# Patient Record
Sex: MALE | Race: WHITE | Employment: OTHER | URBAN - NONMETROPOLITAN AREA
[De-identification: names, ages, dates, MRNs, and addresses within clinical notes are randomized per-mention and may not be internally consistent; named-entity substitution may affect disease eponyms.]

---

## 2021-01-01 ENCOUNTER — APPOINTMENT (OUTPATIENT)
Dept: GENERAL RADIOLOGY | Age: 66
DRG: 283 | End: 2021-01-01
Attending: FAMILY MEDICINE
Payer: OTHER GOVERNMENT

## 2021-01-01 ENCOUNTER — HOSPITAL ENCOUNTER (INPATIENT)
Age: 66
LOS: 2 days | DRG: 283 | End: 2021-06-25
Attending: FAMILY MEDICINE | Admitting: FAMILY MEDICINE
Payer: OTHER GOVERNMENT

## 2021-01-01 VITALS — WEIGHT: 144 LBS | TEMPERATURE: 96.7 F | OXYGEN SATURATION: 90 % | HEIGHT: 71 IN | BODY MASS INDEX: 20.16 KG/M2

## 2021-01-01 LAB
ANION GAP SERPL CALCULATED.3IONS-SCNC: 12 MMOL/L (ref 7–19)
ANION GAP SERPL CALCULATED.3IONS-SCNC: 13 MMOL/L (ref 7–19)
ANION GAP SERPL CALCULATED.3IONS-SCNC: 14 MMOL/L (ref 7–19)
BASOPHILS ABSOLUTE: 0 K/UL (ref 0–0.2)
BASOPHILS ABSOLUTE: 0.1 K/UL (ref 0–0.2)
BASOPHILS RELATIVE PERCENT: 0.3 % (ref 0–1)
BASOPHILS RELATIVE PERCENT: 0.6 % (ref 0–1)
BUN BLDV-MCNC: 24 MG/DL (ref 8–23)
BUN BLDV-MCNC: 27 MG/DL (ref 8–23)
BUN BLDV-MCNC: 34 MG/DL (ref 8–23)
CALCIUM SERPL-MCNC: 7.9 MG/DL (ref 8.8–10.2)
CALCIUM SERPL-MCNC: 8 MG/DL (ref 8.8–10.2)
CALCIUM SERPL-MCNC: 8.2 MG/DL (ref 8.8–10.2)
CHLORIDE BLD-SCNC: 102 MMOL/L (ref 98–111)
CHLORIDE BLD-SCNC: 102 MMOL/L (ref 98–111)
CHLORIDE BLD-SCNC: 103 MMOL/L (ref 98–111)
CHOLESTEROL, TOTAL: 172 MG/DL (ref 160–199)
CO2: 20 MMOL/L (ref 22–29)
CO2: 22 MMOL/L (ref 22–29)
CO2: 24 MMOL/L (ref 22–29)
CREAT SERPL-MCNC: 0.8 MG/DL (ref 0.5–1.2)
CREAT SERPL-MCNC: 0.9 MG/DL (ref 0.5–1.2)
CREAT SERPL-MCNC: 1 MG/DL (ref 0.5–1.2)
EOSINOPHILS ABSOLUTE: 0 K/UL (ref 0–0.6)
EOSINOPHILS ABSOLUTE: 0 K/UL (ref 0–0.6)
EOSINOPHILS RELATIVE PERCENT: 0.1 % (ref 0–5)
EOSINOPHILS RELATIVE PERCENT: 0.5 % (ref 0–5)
GFR AFRICAN AMERICAN: >59
GFR NON-AFRICAN AMERICAN: >60
GLUCOSE BLD-MCNC: 110 MG/DL (ref 74–109)
GLUCOSE BLD-MCNC: 118 MG/DL (ref 74–109)
GLUCOSE BLD-MCNC: 154 MG/DL (ref 74–109)
HCT VFR BLD CALC: 35.3 % (ref 42–52)
HCT VFR BLD CALC: 37.1 % (ref 42–52)
HDLC SERPL-MCNC: 39 MG/DL (ref 55–121)
HEMOGLOBIN: 11.7 G/DL (ref 14–18)
HEMOGLOBIN: 11.9 G/DL (ref 14–18)
IMMATURE GRANULOCYTES #: 0 K/UL
IMMATURE GRANULOCYTES #: 0 K/UL
LDL CHOLESTEROL CALCULATED: 116 MG/DL
LV EF: 45 %
LVEF MODALITY: NORMAL
LYMPHOCYTES ABSOLUTE: 0.7 K/UL (ref 1.1–4.5)
LYMPHOCYTES ABSOLUTE: 1.5 K/UL (ref 1.1–4.5)
LYMPHOCYTES RELATIVE PERCENT: 17.8 % (ref 20–40)
LYMPHOCYTES RELATIVE PERCENT: 8.3 % (ref 20–40)
MCH RBC QN AUTO: 29.1 PG (ref 27–31)
MCH RBC QN AUTO: 29.5 PG (ref 27–31)
MCHC RBC AUTO-ENTMCNC: 32.1 G/DL (ref 33–37)
MCHC RBC AUTO-ENTMCNC: 33.1 G/DL (ref 33–37)
MCV RBC AUTO: 89.1 FL (ref 80–94)
MCV RBC AUTO: 90.7 FL (ref 80–94)
MONOCYTES ABSOLUTE: 0.9 K/UL (ref 0–0.9)
MONOCYTES ABSOLUTE: 1.1 K/UL (ref 0–0.9)
MONOCYTES RELATIVE PERCENT: 11 % (ref 0–10)
MONOCYTES RELATIVE PERCENT: 13.3 % (ref 0–10)
NEUTROPHILS ABSOLUTE: 5.8 K/UL (ref 1.5–7.5)
NEUTROPHILS ABSOLUTE: 6.3 K/UL (ref 1.5–7.5)
NEUTROPHILS RELATIVE PERCENT: 67.3 % (ref 50–65)
NEUTROPHILS RELATIVE PERCENT: 79.9 % (ref 50–65)
PDW BLD-RTO: 14.6 % (ref 11.5–14.5)
PDW BLD-RTO: 14.9 % (ref 11.5–14.5)
PLATELET # BLD: 231 K/UL (ref 130–400)
PLATELET # BLD: 269 K/UL (ref 130–400)
PMV BLD AUTO: 10.5 FL (ref 9.4–12.4)
PMV BLD AUTO: 10.6 FL (ref 9.4–12.4)
POTASSIUM REFLEX MAGNESIUM: 4.4 MMOL/L (ref 3.5–5)
POTASSIUM REFLEX MAGNESIUM: 4.8 MMOL/L (ref 3.5–5)
POTASSIUM REFLEX MAGNESIUM: 5.1 MMOL/L (ref 3.5–5)
RBC # BLD: 3.96 M/UL (ref 4.7–6.1)
RBC # BLD: 4.09 M/UL (ref 4.7–6.1)
SODIUM BLD-SCNC: 137 MMOL/L (ref 136–145)
SODIUM BLD-SCNC: 137 MMOL/L (ref 136–145)
SODIUM BLD-SCNC: 138 MMOL/L (ref 136–145)
TRIGL SERPL-MCNC: 83 MG/DL (ref 0–149)
TROPONIN: 0.07 NG/ML (ref 0–0.03)
TROPONIN: 0.08 NG/ML (ref 0–0.03)
TROPONIN: 0.09 NG/ML (ref 0–0.03)
WBC # BLD: 7.8 K/UL (ref 4.8–10.8)
WBC # BLD: 8.6 K/UL (ref 4.8–10.8)

## 2021-01-01 PROCEDURE — 80061 LIPID PANEL: CPT

## 2021-01-01 PROCEDURE — 6370000000 HC RX 637 (ALT 250 FOR IP): Performed by: FAMILY MEDICINE

## 2021-01-01 PROCEDURE — 99222 1ST HOSP IP/OBS MODERATE 55: CPT | Performed by: INTERNAL MEDICINE

## 2021-01-01 PROCEDURE — 94640 AIRWAY INHALATION TREATMENT: CPT

## 2021-01-01 PROCEDURE — 6360000002 HC RX W HCPCS: Performed by: FAMILY MEDICINE

## 2021-01-01 PROCEDURE — 85025 COMPLETE CBC W/AUTO DIFF WBC: CPT

## 2021-01-01 PROCEDURE — 2700000000 HC OXYGEN THERAPY PER DAY

## 2021-01-01 PROCEDURE — 36415 COLL VENOUS BLD VENIPUNCTURE: CPT

## 2021-01-01 PROCEDURE — 6370000000 HC RX 637 (ALT 250 FOR IP): Performed by: HOSPITALIST

## 2021-01-01 PROCEDURE — 51798 US URINE CAPACITY MEASURE: CPT

## 2021-01-01 PROCEDURE — 93005 ELECTROCARDIOGRAM TRACING: CPT

## 2021-01-01 PROCEDURE — 2140000000 HC CCU INTERMEDIATE R&B

## 2021-01-01 PROCEDURE — 93306 TTE W/DOPPLER COMPLETE: CPT

## 2021-01-01 PROCEDURE — 6360000002 HC RX W HCPCS: Performed by: HOSPITALIST

## 2021-01-01 PROCEDURE — 2580000003 HC RX 258: Performed by: FAMILY MEDICINE

## 2021-01-01 PROCEDURE — 94761 N-INVAS EAR/PLS OXIMETRY MLT: CPT

## 2021-01-01 PROCEDURE — 80048 BASIC METABOLIC PNL TOTAL CA: CPT

## 2021-01-01 PROCEDURE — 84484 ASSAY OF TROPONIN QUANT: CPT

## 2021-01-01 RX ORDER — SODIUM CHLORIDE 0.9 % (FLUSH) 0.9 %
5-40 SYRINGE (ML) INJECTION PRN
Status: DISCONTINUED | OUTPATIENT
Start: 2021-01-01 | End: 2021-01-01 | Stop reason: HOSPADM

## 2021-01-01 RX ORDER — LEVOTHYROXINE SODIUM 0.1 MG/1
100 TABLET ORAL
Status: DISCONTINUED | OUTPATIENT
Start: 2021-01-01 | End: 2021-01-01 | Stop reason: HOSPADM

## 2021-01-01 RX ORDER — PANTOPRAZOLE SODIUM 20 MG/1
20 TABLET, DELAYED RELEASE ORAL DAILY
Status: DISCONTINUED | OUTPATIENT
Start: 2021-01-01 | End: 2021-01-01 | Stop reason: HOSPADM

## 2021-01-01 RX ORDER — ACETAMINOPHEN 325 MG/1
650 TABLET ORAL EVERY 6 HOURS PRN
Status: DISCONTINUED | OUTPATIENT
Start: 2021-01-01 | End: 2021-01-01 | Stop reason: HOSPADM

## 2021-01-01 RX ORDER — BUDESONIDE AND FORMOTEROL FUMARATE DIHYDRATE 160; 4.5 UG/1; UG/1
2 AEROSOL RESPIRATORY (INHALATION) 2 TIMES DAILY
Status: DISCONTINUED | OUTPATIENT
Start: 2021-01-01 | End: 2021-01-01 | Stop reason: CLARIF

## 2021-01-01 RX ORDER — TAMSULOSIN HYDROCHLORIDE 0.4 MG/1
0.4 CAPSULE ORAL DAILY
Status: DISCONTINUED | OUTPATIENT
Start: 2021-01-01 | End: 2021-01-01 | Stop reason: HOSPADM

## 2021-01-01 RX ORDER — ALBUTEROL SULFATE 90 UG/1
2 AEROSOL, METERED RESPIRATORY (INHALATION) EVERY 6 HOURS PRN
COMMUNITY

## 2021-01-01 RX ORDER — MAGNESIUM SULFATE IN WATER 40 MG/ML
2000 INJECTION, SOLUTION INTRAVENOUS PRN
Status: DISCONTINUED | OUTPATIENT
Start: 2021-01-01 | End: 2021-01-01 | Stop reason: HOSPADM

## 2021-01-01 RX ORDER — SODIUM CHLORIDE 9 MG/ML
25 INJECTION, SOLUTION INTRAVENOUS PRN
Status: DISCONTINUED | OUTPATIENT
Start: 2021-01-01 | End: 2021-01-01 | Stop reason: HOSPADM

## 2021-01-01 RX ORDER — 0.9 % SODIUM CHLORIDE 0.9 %
500 INTRAVENOUS SOLUTION INTRAVENOUS ONCE
Status: COMPLETED | OUTPATIENT
Start: 2021-01-01 | End: 2021-01-01

## 2021-01-01 RX ORDER — POLYETHYLENE GLYCOL 3350 17 G/17G
17 POWDER, FOR SOLUTION ORAL DAILY PRN
Status: DISCONTINUED | OUTPATIENT
Start: 2021-01-01 | End: 2021-01-01

## 2021-01-01 RX ORDER — DOCUSATE SODIUM 100 MG/1
100 CAPSULE, LIQUID FILLED ORAL 2 TIMES DAILY
COMMUNITY

## 2021-01-01 RX ORDER — MEGESTROL ACETATE 40 MG/ML
400 SUSPENSION ORAL DAILY
COMMUNITY

## 2021-01-01 RX ORDER — ALBUTEROL SULFATE 2.5 MG/3ML
2.5 SOLUTION RESPIRATORY (INHALATION) EVERY 6 HOURS PRN
Status: DISCONTINUED | OUTPATIENT
Start: 2021-01-01 | End: 2021-01-01 | Stop reason: HOSPADM

## 2021-01-01 RX ORDER — ATORVASTATIN CALCIUM 40 MG/1
80 TABLET, FILM COATED ORAL NIGHTLY
Status: DISCONTINUED | OUTPATIENT
Start: 2021-01-01 | End: 2021-01-01 | Stop reason: HOSPADM

## 2021-01-01 RX ORDER — POTASSIUM CHLORIDE 20 MEQ/1
40 TABLET, EXTENDED RELEASE ORAL PRN
Status: DISCONTINUED | OUTPATIENT
Start: 2021-01-01 | End: 2021-01-01 | Stop reason: HOSPADM

## 2021-01-01 RX ORDER — ARFORMOTEROL TARTRATE 15 UG/2ML
15 SOLUTION RESPIRATORY (INHALATION) 2 TIMES DAILY
Status: DISCONTINUED | OUTPATIENT
Start: 2021-01-01 | End: 2021-01-01 | Stop reason: HOSPADM

## 2021-01-01 RX ORDER — LEVOTHYROXINE SODIUM 0.1 MG/1
100 TABLET ORAL
COMMUNITY

## 2021-01-01 RX ORDER — POLYETHYLENE GLYCOL 3350 17 G/17G
17 POWDER, FOR SOLUTION ORAL 2 TIMES DAILY
Status: DISCONTINUED | OUTPATIENT
Start: 2021-01-01 | End: 2021-01-01 | Stop reason: HOSPADM

## 2021-01-01 RX ORDER — HYDROCODONE BITARTRATE AND ACETAMINOPHEN 10; 325 MG/1; MG/1
1 TABLET ORAL EVERY 6 HOURS PRN
COMMUNITY

## 2021-01-01 RX ORDER — VITAMIN B COMPLEX
2000 TABLET ORAL DAILY
Status: DISCONTINUED | OUTPATIENT
Start: 2021-01-01 | End: 2021-01-01 | Stop reason: HOSPADM

## 2021-01-01 RX ORDER — BUDESONIDE 0.5 MG/2ML
0.5 INHALANT ORAL 2 TIMES DAILY
Status: DISCONTINUED | OUTPATIENT
Start: 2021-01-01 | End: 2021-01-01 | Stop reason: HOSPADM

## 2021-01-01 RX ORDER — HYDROMORPHONE HYDROCHLORIDE 1 MG/ML
0.5 INJECTION, SOLUTION INTRAMUSCULAR; INTRAVENOUS; SUBCUTANEOUS ONCE
Status: COMPLETED | OUTPATIENT
Start: 2021-01-01 | End: 2021-01-01

## 2021-01-01 RX ORDER — MEGESTROL ACETATE 40 MG/ML
400 SUSPENSION ORAL DAILY
Status: DISCONTINUED | OUTPATIENT
Start: 2021-01-01 | End: 2021-01-01 | Stop reason: HOSPADM

## 2021-01-01 RX ORDER — ONDANSETRON 2 MG/ML
4 INJECTION INTRAMUSCULAR; INTRAVENOUS EVERY 6 HOURS PRN
Status: DISCONTINUED | OUTPATIENT
Start: 2021-01-01 | End: 2021-01-01 | Stop reason: HOSPADM

## 2021-01-01 RX ORDER — ACETAMINOPHEN 650 MG/1
650 SUPPOSITORY RECTAL EVERY 6 HOURS PRN
Status: DISCONTINUED | OUTPATIENT
Start: 2021-01-01 | End: 2021-01-01 | Stop reason: HOSPADM

## 2021-01-01 RX ORDER — PANTOPRAZOLE SODIUM 20 MG/1
20 TABLET, DELAYED RELEASE ORAL DAILY
COMMUNITY

## 2021-01-01 RX ORDER — POTASSIUM CHLORIDE 7.45 MG/ML
10 INJECTION INTRAVENOUS PRN
Status: DISCONTINUED | OUTPATIENT
Start: 2021-01-01 | End: 2021-01-01 | Stop reason: HOSPADM

## 2021-01-01 RX ORDER — ALBUTEROL SULFATE 90 UG/1
2 AEROSOL, METERED RESPIRATORY (INHALATION) EVERY 6 HOURS PRN
Status: DISCONTINUED | OUTPATIENT
Start: 2021-01-01 | End: 2021-01-01

## 2021-01-01 RX ORDER — BUDESONIDE AND FORMOTEROL FUMARATE DIHYDRATE 160; 4.5 UG/1; UG/1
2 AEROSOL RESPIRATORY (INHALATION) 2 TIMES DAILY
COMMUNITY

## 2021-01-01 RX ORDER — DOCUSATE SODIUM 100 MG/1
100 CAPSULE, LIQUID FILLED ORAL 2 TIMES DAILY
Status: DISCONTINUED | OUTPATIENT
Start: 2021-01-01 | End: 2021-01-01 | Stop reason: HOSPADM

## 2021-01-01 RX ORDER — BENZONATATE 100 MG/1
200 CAPSULE ORAL 3 TIMES DAILY PRN
Status: DISCONTINUED | OUTPATIENT
Start: 2021-01-01 | End: 2021-01-01 | Stop reason: HOSPADM

## 2021-01-01 RX ORDER — ONDANSETRON 4 MG/1
4 TABLET, ORALLY DISINTEGRATING ORAL EVERY 8 HOURS PRN
Status: DISCONTINUED | OUTPATIENT
Start: 2021-01-01 | End: 2021-01-01 | Stop reason: HOSPADM

## 2021-01-01 RX ORDER — ASPIRIN 81 MG/1
81 TABLET, CHEWABLE ORAL DAILY
Status: DISCONTINUED | OUTPATIENT
Start: 2021-01-01 | End: 2021-01-01 | Stop reason: HOSPADM

## 2021-01-01 RX ORDER — MECOBALAMIN 5000 MCG
5 TABLET,DISINTEGRATING ORAL NIGHTLY
Status: DISCONTINUED | OUTPATIENT
Start: 2021-01-01 | End: 2021-01-01 | Stop reason: HOSPADM

## 2021-01-01 RX ORDER — HYDROCODONE BITARTRATE AND ACETAMINOPHEN 10; 325 MG/1; MG/1
1 TABLET ORAL EVERY 6 HOURS PRN
Status: DISCONTINUED | OUTPATIENT
Start: 2021-01-01 | End: 2021-01-01 | Stop reason: HOSPADM

## 2021-01-01 RX ORDER — SODIUM CHLORIDE 0.9 % (FLUSH) 0.9 %
5-40 SYRINGE (ML) INJECTION EVERY 12 HOURS SCHEDULED
Status: DISCONTINUED | OUTPATIENT
Start: 2021-01-01 | End: 2021-01-01 | Stop reason: HOSPADM

## 2021-01-01 RX ADMIN — ARFORMOTEROL TARTRATE 15 MCG: 15 SOLUTION RESPIRATORY (INHALATION) at 06:36

## 2021-01-01 RX ADMIN — LEVOTHYROXINE SODIUM 100 MCG: 100 TABLET ORAL at 06:05

## 2021-01-01 RX ADMIN — ATORVASTATIN CALCIUM 80 MG: 40 TABLET, FILM COATED ORAL at 20:49

## 2021-01-01 RX ADMIN — SODIUM CHLORIDE, PRESERVATIVE FREE 10 ML: 5 INJECTION INTRAVENOUS at 08:56

## 2021-01-01 RX ADMIN — ARFORMOTEROL TARTRATE 15 MCG: 15 SOLUTION RESPIRATORY (INHALATION) at 06:29

## 2021-01-01 RX ADMIN — BUDESONIDE 500 MCG: 0.5 SUSPENSION RESPIRATORY (INHALATION) at 06:29

## 2021-01-01 RX ADMIN — POLYETHYLENE GLYCOL 3350 17 G: 17 POWDER, FOR SOLUTION ORAL at 10:03

## 2021-01-01 RX ADMIN — ASPIRIN 81 MG: 81 TABLET, CHEWABLE ORAL at 10:03

## 2021-01-01 RX ADMIN — HYDROMORPHONE HYDROCHLORIDE 0.5 MG: 1 INJECTION, SOLUTION INTRAMUSCULAR; INTRAVENOUS; SUBCUTANEOUS at 22:55

## 2021-01-01 RX ADMIN — BUDESONIDE 500 MCG: 0.5 SUSPENSION RESPIRATORY (INHALATION) at 06:36

## 2021-01-01 RX ADMIN — ENOXAPARIN SODIUM 70 MG: 80 INJECTION SUBCUTANEOUS at 10:04

## 2021-01-01 RX ADMIN — TAMSULOSIN HYDROCHLORIDE 0.4 MG: 0.4 CAPSULE ORAL at 10:03

## 2021-01-01 RX ADMIN — LEVOTHYROXINE SODIUM 100 MCG: 100 TABLET ORAL at 11:23

## 2021-01-01 RX ADMIN — HYDROCODONE BITARTRATE AND ACETAMINOPHEN 1 TABLET: 10; 325 TABLET ORAL at 15:17

## 2021-01-01 RX ADMIN — Medication 5 MG: at 21:02

## 2021-01-01 RX ADMIN — ATORVASTATIN CALCIUM 80 MG: 40 TABLET, FILM COATED ORAL at 21:02

## 2021-01-01 RX ADMIN — ONDANSETRON 4 MG: 2 INJECTION INTRAMUSCULAR; INTRAVENOUS at 01:52

## 2021-01-01 RX ADMIN — SODIUM CHLORIDE, PRESERVATIVE FREE 10 ML: 5 INJECTION INTRAVENOUS at 21:02

## 2021-01-01 RX ADMIN — Medication 2000 UNITS: at 10:03

## 2021-01-01 RX ADMIN — DOCUSATE SODIUM 100 MG: 100 CAPSULE, LIQUID FILLED ORAL at 10:03

## 2021-01-01 RX ADMIN — PANTOPRAZOLE SODIUM 20 MG: 20 TABLET, DELAYED RELEASE ORAL at 08:55

## 2021-01-01 RX ADMIN — MEGESTROL ACETATE 400 MG: 40 SUSPENSION ORAL at 10:03

## 2021-01-01 RX ADMIN — SODIUM CHLORIDE 500 ML: 9 INJECTION, SOLUTION INTRAVENOUS at 18:05

## 2021-01-01 RX ADMIN — ENOXAPARIN SODIUM 70 MG: 80 INJECTION SUBCUTANEOUS at 20:49

## 2021-01-01 RX ADMIN — SODIUM CHLORIDE, PRESERVATIVE FREE 10 ML: 5 INJECTION INTRAVENOUS at 10:04

## 2021-01-01 RX ADMIN — HYDROCODONE BITARTRATE AND ACETAMINOPHEN 1 TABLET: 10; 325 TABLET ORAL at 08:55

## 2021-01-01 RX ADMIN — HYDROCODONE BITARTRATE AND ACETAMINOPHEN 1 TABLET: 10; 325 TABLET ORAL at 18:05

## 2021-01-01 RX ADMIN — ARFORMOTEROL TARTRATE 15 MCG: 15 SOLUTION RESPIRATORY (INHALATION) at 17:43

## 2021-01-01 RX ADMIN — Medication 5 MG: at 20:49

## 2021-01-01 RX ADMIN — BUDESONIDE 500 MCG: 0.5 SUSPENSION RESPIRATORY (INHALATION) at 19:56

## 2021-01-01 RX ADMIN — DOCUSATE SODIUM 100 MG: 100 CAPSULE, LIQUID FILLED ORAL at 21:02

## 2021-01-01 RX ADMIN — ENOXAPARIN SODIUM 70 MG: 80 INJECTION SUBCUTANEOUS at 17:09

## 2021-01-01 RX ADMIN — ASPIRIN 81 MG: 81 TABLET, CHEWABLE ORAL at 08:55

## 2021-01-01 RX ADMIN — DOCUSATE SODIUM 100 MG: 100 CAPSULE, LIQUID FILLED ORAL at 08:55

## 2021-01-01 RX ADMIN — DOCUSATE SODIUM 100 MG: 100 CAPSULE, LIQUID FILLED ORAL at 20:49

## 2021-01-01 RX ADMIN — Medication 2000 UNITS: at 08:58

## 2021-01-01 RX ADMIN — ARFORMOTEROL TARTRATE 15 MCG: 15 SOLUTION RESPIRATORY (INHALATION) at 19:56

## 2021-01-01 RX ADMIN — BUDESONIDE 500 MCG: 0.5 SUSPENSION RESPIRATORY (INHALATION) at 17:42

## 2021-01-01 RX ADMIN — PANTOPRAZOLE SODIUM 20 MG: 20 TABLET, DELAYED RELEASE ORAL at 10:03

## 2021-01-01 RX ADMIN — HYDROCODONE BITARTRATE AND ACETAMINOPHEN 1 TABLET: 10; 325 TABLET ORAL at 21:02

## 2021-01-01 ASSESSMENT — PAIN SCALES - GENERAL
PAINLEVEL_OUTOF10: 0
PAINLEVEL_OUTOF10: 10
PAINLEVEL_OUTOF10: 2
PAINLEVEL_OUTOF10: 6
PAINLEVEL_OUTOF10: 7
PAINLEVEL_OUTOF10: 9
PAINLEVEL_OUTOF10: 2
PAINLEVEL_OUTOF10: 0
PAINLEVEL_OUTOF10: 6

## 2021-01-01 ASSESSMENT — ENCOUNTER SYMPTOMS
CHEST TIGHTNESS: 1
TROUBLE SWALLOWING: 0
EYE REDNESS: 0
SHORTNESS OF BREATH: 1
NAUSEA: 0
GASTROINTESTINAL NEGATIVE: 1
SORE THROAT: 0
SHORTNESS OF BREATH: 1
VOMITING: 0
BACK PAIN: 1

## 2021-01-01 ASSESSMENT — PAIN DESCRIPTION - LOCATION
LOCATION: BACK
LOCATION: ABDOMEN;BACK

## 2021-01-01 ASSESSMENT — PAIN SCALES - WONG BAKER
WONGBAKER_NUMERICALRESPONSE: 2
WONGBAKER_NUMERICALRESPONSE: 2
WONGBAKER_NUMERICALRESPONSE: 4

## 2021-01-01 ASSESSMENT — PAIN DESCRIPTION - PAIN TYPE
TYPE: CHRONIC PAIN

## 2021-06-23 PROBLEM — I21.4 NSTEMI (NON-ST ELEVATED MYOCARDIAL INFARCTION) (HCC): Status: ACTIVE | Noted: 2021-01-01

## 2021-06-23 PROBLEM — C67.9 MALIGNANT NEOPLASM OF URINARY BLADDER (HCC): Status: ACTIVE | Noted: 2018-07-13

## 2021-06-23 NOTE — H&P
HISTORY AND PHYSICAL             Date: 6/23/2021        Patient Name: Chance Cortes     YOB: 1955      Age:  78 y.o. Chief Complaint     Chest pain  -transferred from [facility] with diagnosis of NSTEMI      History Obtained From   patient, electronic medical record, transferring provider    History of Present Illness   Patient is a 50-year-old  male with a known past medical history of bladder cancer, with metastasis to the spine, diagnosed originally 4 years ago completed cycles of radiation as well as chemotherapy, states most recently we found small area of the bladder with concern for recurrence. Presented to [address] [place] [address] due to episode of chest pain, flushness which occurred in the a.m. yesterday, second episode occurred in the evening and third again this a.m. Work-up at [facility] revealed elevation of troponin levels 3.37, EKG with known right bundle branch block. CTA chest was obtained with no evidence for PE, findings of sclerosis of the T2-T6 spine. Upon arrival patient placed in PCU, alert conversational denies obvious chest discomfort, EKG revealing sinus tachycardia, patient to be seen by cardiology consultation we placed n.p.o. after midnight did receive therapeutic dose of Lovenox at [facility]. Past Medical History     Past Medical History:   Diagnosis Date    Back pain     Cervical spondylosis     COPD (chronic obstructive pulmonary disease) (HCC)     Hyperlipidemia     Malignant neoplasm (HCC)     Non-toxic multinodular goiter     Peripheral vascular disease ([facility] [address]     Secondary malignant neoplasm of bone (HCC)     Spondylolisthesis     L5S1 level    Thyroid disease         Past Surgical History   History reviewed. No pertinent surgical history. Medications Prior to Admission     Prior to Admission medications    Medication Sig Start Date End Date Taking?  Authorizing Provider   albuterol sulfate HFA (VENTOLIN HFA) 108 (90 Base) MCG/ACT inhaler Inhale 2 puffs into the lungs every 6 hours as needed for Wheezing   Yes Historical Provider, MD   budesonide-formoterol (SYMBICORT) 160-4.5 MCG/ACT AERO Inhale 2 puffs into the lungs 2 times daily   Yes Historical Provider, MD   vitamin D (CHOLECALCIFEROL) 25 MCG (1000 UT) TABS tablet Take 2,000 Units by mouth daily   Yes Historical Provider, MD   docusate sodium (COLACE) 100 MG capsule Take 100 mg by mouth 2 times daily   Yes Historical Provider, MD   HYDROcodone-acetaminophen (NORCO)  MG per tablet Take 1 tablet by mouth every 6 hours as needed for Pain. Yes Historical Provider, MD   levothyroxine (SYNTHROID) 100 MCG tablet Take 100 mcg by mouth every morning (before breakfast)   Yes Historical Provider, MD   megestrol acetate (MEGACE) 400 MG/10ML SUSP Take 400 mg by mouth daily   Yes Historical Provider, MD   pantoprazole (PROTONIX) 20 MG tablet Take 20 mg by mouth daily   Yes Historical Provider, MD        Allergies   Patient has no known allergies. Social History     Social History    None         Family History   History reviewed. No pertinent family history. Review of Systems   Review of Systems   Constitutional: Positive for activity change, appetite change and fatigue. HENT: Negative for sore throat and trouble swallowing. Eyes: Negative for redness and visual disturbance. Respiratory: Positive for chest tightness and shortness of breath. Cardiovascular: Positive for chest pain. Gastrointestinal: Negative for nausea and vomiting. Genitourinary: Negative for difficulty urinating. Musculoskeletal: Positive for arthralgias and back pain. Skin: Positive for pallor. Neurological: Positive for weakness and light-headedness. Negative for headaches. Psychiatric/Behavioral: Negative for agitation and confusion. Physical Exam   There were no vitals taken for this visit. Physical Exam  Vitals and nursing note reviewed.    Constitutional: PPI    Hypothyroidism-chronic condition, continue with Synthroid dosing    History of bladder cancer with spinal metastasis  -Noted, continue with pain control modalities, up to chair activity as tolerated        Advanced directives-DNR  -DVT prophylaxis-Lovenox    Consultations Ordered:  IP CONSULT TO CARDIOLOGY  IP CONSULT TO DIETITIAN    EMR Dragon/Transcription disclaimer:   Much of this encounter note is an electronic transcription/translation of spoken language to printed text.  The electronic translation of spoken language may permit erroneous, or at times, nonsensical words or phrases to be inadvertently transcribed; although attempts have made to review the note for such errors, some may still exist.    Electronically signed by   Courtney Garza MD   Internal Medicine Hospitalist  On 6/23/2021  At 6:54 PM

## 2021-06-24 PROBLEM — E43 SEVERE MALNUTRITION (HCC): Status: ACTIVE | Noted: 2021-01-01

## 2021-06-24 PROBLEM — Z51.5 PALLIATIVE CARE PATIENT: Status: ACTIVE | Noted: 2021-01-01

## 2021-06-24 NOTE — CONSULTS
deciding factor in going to the hospital.  He shares with me that he and his wife have talked about whether or not to begin new tx. He states she would prefer he have quality of life rather than be sick with tx. Palliative following for support, goals of care.                       Electronically signed by Jeyson Smith RN on 6/24/2021 at 10:36 AM

## 2021-06-24 NOTE — CONSULTS
[facility]) Cardiology   Consult Note   Geeta Moulton       Requesting MD:  Anu Moon MD   Admit Status:  Inpatient [101]       History obtained from:   [x] Patient  [] Other (specify):     Patient:  Sharmaine Roman  [id number]     Chief Complaint: Chest pain      HPI: Mr. Lucrecia Pal is a 78 y.o. male with a history of COPD, history of bladder cancer, hyperlipidemia, history of peripheral vascular disease    Patient was transferred from [facility] for higher level of care after he presented to them with chest pain and found to be having NSTEMI    His chest pain has been recurrent    Work-up with CT angio showed no evidence of pulmonary embolism, his EKG did not show any acute ST elevation, cardiology was consulted to help with the management of his possible NSTEMI NSTEMI    Patient seen in the floor today with nursing staff during rounds  He tells me that he has history of bladder cancer with recent work-up showed that he is stage IV with bone metastasis, he is also DNR    He denies any active chest pain  He tells me he is not interested in anything invasive but would like to proceed management medically    Review of Systems:  Review of Systems   Constitutional: Positive for fatigue. Respiratory: Positive for shortness of breath. Cardiovascular: Negative for chest pain. Gastrointestinal: Negative. Endocrine: Negative. Genitourinary: Negative. Musculoskeletal: Negative. Skin: Negative. Neurological: Negative. Hematological: Negative.         Cardiac Specific Data:  Specialty Problems        Cardiology Problems    * (Principal) NSTEMI (non-ST elevated myocardial infarction) [facility])              Past Medical History:  Past Medical History:   Diagnosis Date    Back pain     Cervical spondylosis     COPD (chronic obstructive pulmonary disease) (HCC)     Hyperlipidemia     Malignant neoplasm (HCC)     Non-toxic multinodular goiter     Peripheral vascular disease ([facility] [address]     Secondary malignant neoplasm of bone (HCC)     Spondylolisthesis     L5S1 level    Thyroid disease         Past Surgical History:  History reviewed. No pertinent surgical history. Past Family History:  History reviewed. No pertinent family history. Past Social History:  Social History     Socioeconomic History    Marital status:      Spouse name: Not on file    Number of children: Not on file    Years of education: Not on file    Highest education level: Not on file   Occupational History    Not on file   Tobacco Use    Smoking status: Current Every Day Smoker     Packs/day: 0.25     Years: 40.00     Pack years: 10.00     Types: Cigarettes    Smokeless tobacco: Never Used    Tobacco comment: 2 cigarettes per day   Vaping Use    Vaping Use: Never used   Substance and Sexual Activity    Alcohol use: Not Currently    Drug use: Not on file    Sexual activity: Not on file   Other Topics Concern    Not on file   Social History Narrative    Not on file     Social Determinants of Health     Financial Resource Strain:     Difficulty of Paying Living Expenses:    Food Insecurity:     Worried About Running Out of Food in the Last Year:     [address] in the Last Year:    Transportation Needs:     Lack of Transportation (Medical):  Lack of Transportation (Non-Medical):    Physical Activity:     Days of Exercise per Week:     Minutes of Exercise per Session:    Stress:     Feeling of Stress :    Social Connections:     Frequency of Communication with Friends and Family:     Frequency of Social Gatherings with Friends and Family:     Attends Islam Services:     Active Member of Clubs or Organizations:     Attends Club or Organization Meetings:     Marital Status:    Intimate Partner Violence:     Fear of Current or Ex-Partner:     Emotionally Abused:     Physically Abused:     Sexually Abused:         Allergies:  No Known Allergies    Home Meds:  Prior to Admission medications    Medication Sig Start Date End Date Taking? Authorizing Provider   albuterol sulfate HFA (VENTOLIN HFA) 108 (90 Base) MCG/ACT inhaler Inhale 2 puffs into the lungs every 6 hours as needed for Wheezing   Yes Historical Provider, MD   budesonide-formoterol (SYMBICORT) 160-4.5 MCG/ACT AERO Inhale 2 puffs into the lungs 2 times daily   Yes Historical Provider, MD   vitamin D (CHOLECALCIFEROL) 25 MCG (1000 UT) TABS tablet Take 2,000 Units by mouth daily   Yes Historical Provider, MD   docusate sodium (COLACE) 100 MG capsule Take 100 mg by mouth 2 times daily   Yes Historical Provider, MD   HYDROcodone-acetaminophen (NORCO)  MG per tablet Take 1 tablet by mouth every 6 hours as needed for Pain.    Yes Historical Provider, MD   levothyroxine (SYNTHROID) 100 MCG tablet Take 100 mcg by mouth every morning (before breakfast)   Yes Historical Provider, MD   megestrol acetate (MEGACE) 400 MG/10ML SUSP Take 400 mg by mouth daily   Yes Historical Provider, MD   pantoprazole (PROTONIX) 20 MG tablet Take 20 mg by mouth daily   Yes Historical Provider, MD       Current Meds:   sodium chloride flush  5-40 mL Intravenous 2 times per day    aspirin  81 mg Oral Daily    atorvastatin  80 mg Oral Nightly    enoxaparin  1 mg/kg Subcutaneous BID    docusate sodium  100 mg Oral BID    levothyroxine  100 mcg Oral QAM AC    megestrol acetate  400 mg Oral Daily    pantoprazole  20 mg Oral Daily    Vitamin D  2,000 Units Oral Daily    budesonide  0.5 mg Nebulization BID    And    Arformoterol Tartrate  15 mcg Nebulization BID    melatonin  5 mg Oral Nightly       Current Infused Meds:   sodium chloride         Physical Exam:  Vitals:    06/24/21 0646   BP: 108/70   Pulse: 97   Resp: 16   Temp: 98.5 °F (36.9 °C)   SpO2: 97%       Intake/Output Summary (Last 24 hours) at 6/24/2021 0857  Last data filed at 6/24/2021 7718  Gross per 24 hour   Intake --   Output 400 ml   Net -400 ml     Estimated body mass index is evaluation showed that his troponin is actually mildly elevated at 0.07 with no clear uptrend, he denies any chest pain and he is a stage IV bladder cancer with bone metastasis, DNR status, patient does not want pursue any invasive measures but would like to be treated with medical therapy. I discussed his case with the palliative care team and that would be the best course of action at this point, will continue with aspirin and atorvastatin, add nitroglycerin tablets if there is no chest pain -his blood pressure is well controlled  2. HLD -continue with statin  3. History of urinary bladder cancer status post radiation and chemotherapy -patient has already established oncologist in [facility], who would like to follow with her      Plan of care discussed with the patient in detail in the presence of nursing staff and palliative care team member  Also discussed with the primary hospitalist      Thank you for the consult, we appreciate the opportunity to provide care to your patients. Feel free to contact me if I can be of any further assistance.       Electronically signed by Wendy Hargrove MD on 6/24/2021 at 8:57 AM    Wendy Hargrove MD, [facility]  Interventional Cardiologist, Endovascular Specialist   Medical Director, [facility]

## 2021-06-24 NOTE — PLAN OF CARE
Problem: Falls - Risk of:  Goal: Will remain free from falls  Description: Will remain free from falls  Outcome: Ongoing  Goal: Absence of physical injury  Description: Absence of physical injury  Outcome: Ongoing     Problem: Infection:  Goal: Will remain free from infection  Description: Will remain free from infection  Outcome: Ongoing     Problem: Safety:  Goal: Free from accidental physical injury  Description: Free from accidental physical injury  Outcome: Ongoing  Goal: Free from intentional harm  Description: Free from intentional harm  Outcome: Ongoing     Problem: Daily Care:  Goal: Daily care needs are met  Description: Daily care needs are met  Outcome: Ongoing     Problem: Pain:  Goal: Patient's pain/discomfort is manageable  Description: Patient's pain/discomfort is manageable  Outcome: Ongoing  Goal: Pain level will decrease  Description: Pain level will decrease  Outcome: Ongoing  Goal: Control of acute pain  Description: Control of acute pain  Outcome: Ongoing  Goal: Control of chronic pain  Description: Control of chronic pain  Outcome: Ongoing     Problem: Skin Integrity:  Goal: Skin integrity will stabilize  Description: Skin integrity will stabilize  Outcome: Ongoing     Problem: Discharge Planning:  Goal: Patients continuum of care needs are met  Description: Patients continuum of care needs are met  Outcome: Ongoing     Problem: Nutrition  Goal: Optimal nutrition therapy  Outcome: Ongoing

## 2021-06-24 NOTE — PLAN OF CARE
Nutrition Problem #1: Unintended weight loss, Severe malnutrition  Intervention: Food and/or Nutrient Delivery: Continue NPO  Nutritional Goals: diet advanced with good tolerance. Weight stable or increase 1-2# per week.   PO intake 50% or greater

## 2021-06-24 NOTE — PROGRESS NOTES
Patient complained to this nurse that he felt dizzy when he stands up. BP obtained while laying in bed, 104/72, BP obtained while standing up 87/53. Notified Dr. Peña Medico. Orders received.  Electronically signed by Andrew Koo RN on 6/24/2021 at 5:41 PM

## 2021-06-24 NOTE — PROGRESS NOTES
Progress Note  Date:2021       Room:0718/718-02  Patient Name:Freddie Clarke     Date of Birth:[birth year]     Age:66 y.o. Subjective    Subjective   Patient seen and examined in multiple portions of the day, this afternoon patient spouse present at the bedside. Conversation had in regards to patient's bladder cancer with metastasis spine diagnosis. Patient at current time voices that he wishes to proceed with comfort measures as well as quality of life after discussion on possible chemotherapy side effects. Spouse at the bedside requesting spiritual/clergy services. Cumulative Hospital course: Patient was admitted , transferred from [place] [address]  male with a known past medical history of bladder cancer, with metastasis to the spine, diagnosed originally 4 years ago completed cycles of radiation as well as chemotherapy, states most recently small area of the bladder with concern for recurrence. Work-up at outlying facility revealed elevation of troponin levels, EKG with chronic right bundle branch block. CTA chest ruled out pulmonary embolism, there was findings of sclerosis amongst the T12-T6 spine. Patient was admitted to PCU, troponins trended: 0.07, 0.08, 0.09. Cardiology attending has assessed patient, plans for transthoracic echocardiogram.   Patient as well as spouse at the bedside detailed conversation, at current time feels that he would like to pursue hospice services and not go through with further therapy prior to diagnosis. Hospice consultation has been placed. Review of Systems   ROS: 14 point review of systems is negative except as specifically addressed above.   Objective         Vitals Last 24 Hours:  TEMPERATURE:  Temp  Av.4 °F (36.9 °C)  Min: 98.2 °F (36.8 °C)  Max: 99 °F (37.2 °C)  RESPIRATIONS RANGE: Resp  Av  Min: 16  Max: 20  PULSE OXIMETRY RANGE: SpO2  Av.4 %  Min: 82 %  Max: 100 %  PULSE RANGE: Pulse  Avg: 99.8  Min: 97  Max: 106  BLOOD PRESSURE RANGE: Systolic (23ESU), KOF:505 , Min:82 , GJH:865   ; Diastolic (31DSD), WKD:12, Min:54, Max:74    I/O (24Hr): Intake/Output Summary (Last 24 hours) at 6/24/2021 1706  Last data filed at 6/24/2021 1345  Gross per 24 hour   Intake 480 ml   Output 400 ml   Net 80 ml     Physical Exam  Vitals and nursing note reviewed. Constitutional:       General: He is not in acute distress. Appearance: He is not ill-appearing. Frail in appearance, fatigued appearing. HENT:      Head: Normocephalic and atraumatic. Nose: Nose normal.   Eyes:      General:         Right eye: No discharge. Left eye: No discharge. Conjunctiva/sclera: Conjunctivae normal.   Cardiovascular:      Rate and Rhythm: Regular rhythm. Rate regular  Pulmonary:      Effort: Pulmonary effort is normal. No respiratory distress. Breath sounds: No wheezing. Abdominal:      Tenderness: There is no abdominal tenderness. There is no guarding or rebound. Musculoskeletal:      Comments: Chronic tenderness of the back  Skin:     Capillary Refill: Capillary refill takes less than 2 seconds. Neurological:      Mental Status: He is alert and oriented to person, place, and time. Mental status is at baseline. Psychiatric:         Mood and Affect: Mood normal.        Labs/Imaging/Diagnostics    Labs:  CBC:  Recent Labs     06/24/21  0211   WBC 8.6   RBC 4.09*   HGB 11.9*   HCT 37.1*   MCV 90.7   RDW 14.9*        CHEMISTRIES:  Recent Labs     06/23/21  1825 06/24/21  0211    138   K 4.4 5.1*    102   CO2 22 24   BUN 24* 27*   CREATININE 0.9 1.0   GLUCOSE 118* 110*     PT/INR:No results for input(s): PROTIME, INR in the last 72 hours. APTT:No results for input(s): APTT in the last 72 hours. LIVER PROFILE:No results for input(s): AST, ALT, BILIDIR, BILITOT, ALKPHOS in the last 72 hours. Imaging Last 24 Hours:  No results found.   Assessment//Plan           Hospital Problems Last Modified POA    * (Principal) NSTEMI (non-ST elevated myocardial infarction) ([facility] [address] 6/23/2021 Yes    Malignant neoplasm of urinary bladder ([facility] [address] 6/23/2021 Yes    Severe malnutrition ([facility] [address] 6/24/2021 Yes    Palliative care patient 6/24/2021 Yes        Chest Pain/NSTEMI   Lab Results   Component Value Date    TROPONINI 0.09 (H) 06/24/2021     No results found for: TROPONINI  - Will trend troponin levels q3Hrs x2  - EKGs q6Hrs as needed for chest pain  - Nitro PRN for chest pain   - Oxygen PRN to maintain Sats >92%  - Telemetry   - Received ASA/therapeutic dose of Lovenox at outlying facility  - Lipid panel ordered   -CTA chest-no evidence of PE  -Plans for transthoracic echocardiogram, no plans for heart catheterization  - Cardiology has been consulted; we appreciate their recommendations       Malnutrition Status:  Severe malnutrition    Context:  Acute Illness     Findings of the 6 clinical characteristics of malnutrition:  Energy Intake:  Mild decrease in energy intake (Comment)  Weight Loss:  7 - Greater than 2% over 1 week     Body Fat Loss:  7 - Moderate body fat loss     Muscle Mass Loss:  1 - Mild muscle mass loss    Fluid Accumulation:  No significant fluid accumulation Extremities   Strength:  Not Performed         History of bladder cancer with spinal metastasis  -Noted, continue with pain control modalities, up to chair activity as tolerated  -Discussion held with patient as well as spouse present at the bedside, at current time patient wishes to pursue comfort modalities, hospice care consultation has been placed.       Hyperkalemia-we will continue to monitor, continue telemetry,  daily metabolic profile       Chronic obstructive pulmonary disease-chronic condition, currently not in acute exacerbation, continue with nebulized modalities, maintain O2 sats greater than 92%     Gastroesophageal reflux disease-chronic condition, continue PPI     Hypothyroidism-chronic condition, continue with Synthroid dosing             Advanced directives-DNR  -DVT prophylaxis-Lovenox                   EMR Dragon/Transcription disclaimer:   Much of this encounter note is an electronic transcription/translation of spoken language to printed text.  The electronic translation of spoken language may permit erroneous, or at times, nonsensical words or phrases to be inadvertently transcribed; although attempts have made to review the note for such errors, some may still exist.    Electronically signed by   Joy Cervantes MD   Internal Medicine Hospitalist  On 6/24/2021  At 5:06 PM

## 2021-06-24 NOTE — CONSULTS
Comprehensive Nutrition Assessment    Type and Reason for Visit:  Initial, Positive Nutrition Screen, Consult    Nutrition Recommendations/Plan: follow for advancing diet, tolerance, po intake, need for ONS    Nutrition Assessment:  Pt is thin appearing, has lost 2.8% weight in 1 week. Is at risk for further nutritional compromise d/t NPO status. Malnutrition Assessment:  Malnutrition Status:  Severe malnutrition    Context:  Acute Illness     Findings of the 6 clinical characteristics of malnutrition:  Energy Intake:  Mild decrease in energy intake (Comment)  Weight Loss:  7 - Greater than 2% over 1 week     Body Fat Loss:  7 - Moderate body fat loss     Muscle Mass Loss:  1 - Mild muscle mass loss    Fluid Accumulation:  No significant fluid accumulation Extremities   Strength:  Not Performed    Estimated Daily Nutrient Needs:  Energy (kcal):  4367-8417 kcals (25-30 kcals/kg);  Weight Used for Energy Requirements:  Current     Protein (g):   g; Weight Used for Protein Requirements:  Current        Fluid (ml/day):  8366-9902 ml; Method Used for Fluid Requirements:  1 ml/kcal      Nutrition Related Findings:  thin      Wounds:  None       Current Nutrition Therapies:    Diet NPO Exceptions are: Sips of Water with Meds    Anthropometric Measures:  · Height: 5' 11\" (180.3 cm)  · Current Body Weight: 144 lb (65.3 kg)   · Admission Body Weight: 144 lb 3.2 oz (65.4 kg)    · Usual Body Weight: 148 lb (67.1 kg) (1 week ago)     · Ideal Body Weight: 172 lbs; % Ideal Body Weight 83.7 %   · BMI: 20.1  · Adjusted Body Weight:  ; No Adjustment   · BMI Categories: Underweight (BMI less than 22) age over 72       Nutrition Diagnosis:   · Unintended weight loss, Severe malnutrition related to acute injury/trauma, catabolic illness as evidenced by weight loss greater than or equal to 2% in 1 week, BMI, NPO or clear liquid status due to medical condition, mild muscle loss, moderate loss of subcutaneous fat      Nutrition Interventions:   Food and/or Nutrient Delivery:  Continue NPO  Nutrition Education/Counseling:  No recommendation at this time   Coordination of Nutrition Care:  Continue to monitor while inpatient    Goals:  diet advanced with good tolerance. Weight stable or increase 1-2# per week. PO intake 50% or greater       Nutrition Monitoring and Evaluation:   Behavioral-Environmental Outcomes:  None Identified   Food/Nutrient Intake Outcomes:  Diet Advancement/Tolerance, Food and Nutrient Intake  Physical Signs/Symptoms Outcomes:  Biochemical Data, Nutrition Focused Physical Findings, Skin, Weight     Discharge Planning:     Too soon to determine     Electronically signed by Otis Mckeon MS, RD, LD on 6/24/21 at 8:00 AM CDT    Contact: [phone]

## 2021-06-24 NOTE — PROGRESS NOTES
Pts wife called this morning asking about changes in pt condition, also she mention she had concerns in regards cancer treatment for pt, she questioned nurse about having a biopsy and cancer related procedures for pt. Nurse explained this will be a decision and something that the patient and doctors would be discussing, she will be updated when we know more about the plan of care. Discussed with dayshift nurse.

## 2021-06-24 NOTE — PROGRESS NOTES
Spoke to pts wife Ami, she has been updated on pts condition, she would like to be notified if there are changes. Pt aware and approves.

## 2021-06-25 NOTE — PROGRESS NOTES
Pt has not voided, bladder scan showed 517cc, straight cath and flomax ordered per hospitalist. Will continue to monitor.

## 2021-06-25 NOTE — FLOWSHEET NOTE
06/25/21 0957   Encounter Summary   Services provided to: Patient   Complexity of Encounter Moderate   Length of Encounter 15 minutes   Routine   Type Follow up   Spiritual/Shinto   Type Spiritual struggle   Assessment Questioning meaning/purpose  (Pt said \"Came here to get well; It's frustrating. \")   Intervention Active listening;Explored feelings, thoughts, concerns   Outcome Expressed regrets     S: Patient stated \"Nothing really to talk about. \"  Mentioned  3 year stint in the [facility]. O: Patient allowed me to sit down on a chair; coughing unceasingly; close-opened eyes for contact.

## 2021-06-25 NOTE — PROGRESS NOTES
Progress Note  Date:2021       Room:0713/713-02  Patient Name:Freddie Rao     Date of Birth:[birth year]     Age:66 y.o. Subjective    Subjective   Patient seen throughout the day on a.m. rounds and subsequently this afternoon status post rapid response  patient has had multiple periods of agonal respirations, blank stares per nursing staff, and many bouts of restlessness attempting to get out of bed unsafely. Patient has met with hospice services here, case management working diligently with the [facility] to set up hospice as well as oxygen modalities for safe discharge. Patient extremely fatigued. Cumulative Hospital course: Patient was admitted , transferred from [place] [address]  male with a known past medical history of bladder cancer, with metastasis to the spine, diagnosed originally 4 years ago completed cycles of radiation as well as chemotherapy, states most recently small area of the bladder with concern for recurrence. Work-up at outlying facility revealed elevation of troponin levels, EKG with chronic right bundle branch block. CTA chest ruled out pulmonary embolism, there was findings of sclerosis amongst the T12-T6 spine. Patient was admitted to PCU, troponins trended: 0.07, 0.08, 0.09. Patient assessed by cardiology, no plans for intervention, transthoracic echocardiogram was obtained which revealed ejection fraction of 52%, grade 1 diastolic dysfunction, hypokinetic right ventricle, severe pulmonary hypertension. Patient has met with hospice services, consult to be sent to patient's local center. Case management working with the [facility], patient also qualifies for 4 L of oxygen therapy. Review of Systems   ROS: 14 point review of systems is negative except as specifically addressed above.   Objective         Vitals Last 24 Hours:  TEMPERATURE:  Temp  Av.5 °F (36.4 °C)  Min: 96.9 °F (36.1 °C)  Max: 97.9 °F (36.6 °C)  RESPIRATIONS RANGE: Resp  Avg: 15.2  Min: 14  Max: 16  PULSE OXIMETRY RANGE: SpO2  Av.1 %  Min: 85 %  Max: 91 %  PULSE RANGE: Pulse  Av.3  Min: 73  Max: 114  BLOOD PRESSURE RANGE: Systolic (31DXO), LGL:273 , Min:87 , XJS:198   ; Diastolic (85THS), RLL:52, Min:53, Max:77    I/O (24Hr): Intake/Output Summary (Last 24 hours) at 2021 1539  Last data filed at 2021 0932  Gross per 24 hour   Intake 240 ml   Output 600 ml   Net -360 ml     Physical Exam  Vitals and nursing note reviewed. Constitutional:       General: Frail, extremely fatigued appearing, ill-appearing  HENT:      Head: Normocephalic and atraumatic.      Nose: Nose normal.   Eyes:      General:         Right eye: No discharge.         Left eye: No discharge.      Conjunctiva/sclera: Conjunctivae normal.   Cardiovascular:      Rate and Rhythm: Regular rhythm.   Rate regular  Pulmonary: Nasal cannula oxygen in place  Diminished breath sounds upon auscultation  Abdominal:      Tenderness: There is no abdominal tenderness. There is no guarding or rebound. Musculoskeletal:      Comments: Chronic tenderness of the back  Skin:     Capillary Refill: Capillary refill takes less than 2 seconds. Neurological:      Mental Status: He is alert and oriented to person, place, and time. Mental status is at baseline. Psychiatric:         Mood and Affect: Mood normal.        Labs/Imaging/Diagnostics    Labs:  CBC:  Recent Labs     21  0211 21  0328   WBC 8.6 7.8   RBC 4.09* 3.96*   HGB 11.9* 11.7*   HCT 37.1* 35.3*   MCV 90.7 89.1   RDW 14.9* 14.6*    231     CHEMISTRIES:  Recent Labs     21  1825 21  0211 21  0328    138 137   K 4.4 5.1* 4.8    102 103   CO2 22 24 20*   BUN 24* 27* 34*   CREATININE 0.9 1.0 0.8   GLUCOSE 118* 110* 154*     PT/INR:No results for input(s): PROTIME, INR in the last 72 hours. APTT:No results for input(s): APTT in the last 72 hours.   LIVER PROFILE:No results for input(s): AST, ALT, BILIDIR, BILITOT, ALKPHOS in the last 72 hours. Imaging Last 24 Hours:  No results found.   Assessment//Plan           Hospital Problems         Last Modified POA    * (Principal) NSTEMI (non-ST elevated myocardial infarction) ([facility] 6/23/2021 Yes    Malignant neoplasm of urinary bladder ([facility] 6/23/2021 Yes    Severe malnutrition ([facility] 6/24/2021 Yes    Palliative care patient 6/24/2021 Yes        Chest Pain/NSTEMI/chronic combined systolic and diastolic congestive heart failure  Lab Results   Component Value Date    TROPONINI 0.09 (H) 06/24/2021     No results found for: TROPONINI  - Will trend troponin levels q3Hrs x2  - EKGs q6Hrs as needed for chest pain  - Nitro PRN for chest pain   - Oxygen PRN to maintain Sats >92%  - Telemetry   - Received ASA/therapeutic dose of Lovenox at outlying facility  - Lipid panel ordered   -CTA chest-no evidence of PE  -Transthoracic echocardiogram with evidence of combined systolic grade 1 diastolic congestive heart failure, hypokinetic right ventricle, severe pulmonary hypertension  -No further plans for cardiac intervention      Severe pulmonary hypertension  -Patient qualifies for 4 L nasal cannula oxygen, DME has been ordered      Malnutrition Status:  Severe malnutrition    Context:  Acute Illness     Findings of the 6 clinical characteristics of malnutrition:  Energy Intake:  Mild decrease in energy intake (Comment)  Weight Loss:  7 - Greater than 2% over 1 week     Body Fat Loss:  7 - Moderate body fat loss     Muscle Mass Loss:  1 - Mild muscle mass loss    Fluid Accumulation:  No significant fluid accumulation Extremities   Strength:  Not Performed         History of bladder cancer with spinal metastasis  -Noted, continue with pain control modalities, up to chair activity as tolerated  -Patient transition to hospice, case management working with the [place] for set up    Hyperkalemia-currently resolved      Chronic obstructive pulmonary disease-chronic condition, currently not in acute exacerbation, continue with nebulized modalities, maintain O2 sats greater than 92%     Gastroesophageal reflux disease-chronic condition, continue PPI     Hypothyroidism-chronic condition, continue with Synthroid dosing             Advanced directives-DNR  -DVT prophylaxis-Lovenox                   EMR Dragon/Transcription disclaimer:   Much of this encounter note is an electronic transcription/translation of spoken language to printed text.  The electronic translation of spoken language may permit erroneous, or at times, nonsensical words or phrases to be inadvertently transcribed; although attempts have made to review the note for such errors, some may still exist.    Electronically signed by   Anh Cardenas MD   Internal Medicine Hospitalist  On 6/25/2021  At 3:39 PM

## 2021-06-25 NOTE — DISCHARGE SUMMARY
echocardiogram with evidence of combined systolic grade 1 diastolic congestive heart failure, hypokinetic right ventricle, severe pulmonary hypertension  -No further plans for cardiac intervention        Severe pulmonary hypertension  -Patient qualifies for 4 L nasal cannula oxygen, DME has been ordered        Malnutrition Status:  Severe malnutrition    Context:  Acute Illness     Findings of the 6 clinical characteristics of malnutrition:  Energy Intake:  Mild decrease in energy intake (Comment)  Weight Loss:  7 - Greater than 2% over 1 week     Body Fat Loss:  7 - Moderate body fat loss     Muscle Mass Loss:  1 - Mild muscle mass loss    Fluid Accumulation:  No significant fluid accumulation Extremities   Strength:  Not Performed           History of bladder cancer with spinal metastasis  -Noted, continue with pain control modalities, up to chair activity as tolerated  -Patient transition to hospice, case management working with the [place] for set up     Hyperkalemia-currently resolved      Chronic obstructive pulmonary disease-chronic condition, currently not in acute exacerbation, continue with nebulized modalities, maintain O2 sats greater than 92%     Gastroesophageal reflux disease-chronic condition, continue PPI     Hypothyroidism-chronic condition, continue with Synthroid dosing        Consultants:   IP CONSULT TO CARDIOLOGY  IP CONSULT TO DIETITIAN  PALLIATIVE CARE EVAL  IP CONSULT TO HOSPICE  IP CONSULT TO SPIRITUAL SERVICES    Time Spent on Discharge:  45 minutes were spent in patient examination, evaluation, counseling as well as medication reconciliation, prescriptions for required medications, discharge plan and follow up.       Surgeries/Procedures Performed:       Treatments:   IV hydration, analgesia: acetaminophen and Norco, cardiac meds: Lipitor, anticoagulation: LMW heparin, respiratory therapy: O2, therapies: PT and OT and lecture light monitoring and stabilization, cardiology consultation, hospice consultation    Significant Diagnostic Studies:   Recent Labs:  CBC:   Lab Results   Component Value Date    WBC 7.8 2021    RBC 3.96 2021    HGB 11.7 2021    HCT 35.3 2021    MCV 89.1 2021    MCH 29.5 2021    MCHC 33.1 2021    RDW 14.6 2021     2021     BMP:    Lab Results   Component Value Date    GLUCOSE 154 2021     2021    K 4.8 2021     2021    CO2 20 2021    ANIONGAP 14 2021    BUN 34 2021    CREATININE 0.8 2021    CALCIUM 7.9 2021    LABGLOM >60 2021    GFRAA >59 2021     HFP:  No results found for: ALB, PROT  CMP:    Lab Results   Component Value Date    GLUCOSE 154 2021     2021    K 4.8 2021     2021    CO2 20 2021    BUN 34 2021    CREATININE 0.8 2021    ANIONGAP 14 2021    LABGLOM >60 2021    GFRAA >59 2021    CALCIUM 7.9 2021     PT/INR:  No results found for: PTINR, PROTIME, INR  PTT: No results found for: APTT  FLP:    Lab Results   Component Value Date    CHOL 172 2021    TRIG 83 2021    HDL 39 2021     U/A:  No results found for: COLORU, TURBIDITY, SPECGRAV, HGBUR, PHUR, PROTEINU, GLUCOSEU, KETUA, BILIRUBINUR, UROBILINOGEN, NITRU, LEUKOCYTESUR  TSH:  No results found for: TSH    Radiology Last 7 Days:  No results found. Physical Exam      Call to respond to unresponsive patient    On physical examination patient did not respond to verbal or physical stimuli  Absent heart and breath sounds  Absent peripheral pulses  Pupils were noted to be fixed and dilated  Patient was pronounced dead at 16:05  Patient spouse notified by nursing staff  Clergy services to be offered    Discharge Plan   Disposition:     Provider Follow-Up:   Rigo [address]  [address] [place] [address] [address] [facility]    On 2021  8:30an hospital follow-up.   If you are unable to keep this appointment, please call and reschedule        Patient Instructions     Patient     Discharge Medications         Medication List      ASK your doctor about these medications    budesonide-formoterol 160-4.5 MCG/ACT Aero  Commonly known as: SYMBICORT     docusate sodium 100 MG capsule  Commonly known as: COLACE     HYDROcodone-acetaminophen  MG per tablet  Commonly known as: NORCO     levothyroxine 100 MCG tablet  Commonly known as: SYNTHROID     megestrol acetate 400 MG/10ML Susp  Commonly known as: MEGACE     pantoprazole 20 MG tablet  Commonly known as: PROTONIX     Ventolin  (90 Base) MCG/ACT inhaler  Generic drug: albuterol sulfate HFA     vitamin D 25 MCG (1000 UT) Tabs tablet  Commonly known as: CHOLECALCIFEROL            EMR Dragon/Transcription disclaimer:   Much of this encounter note is an electronic transcription/translation of spoken language to printed text.  The electronic translation of spoken language may permit erroneous, or at times, nonsensical words or phrases to be inadvertently transcribed; although attempts have made to review the note for such errors, some may still exist.    Electronically signed by   Bettina Bach MD   Internal Medicine Hospitalist  On 2021  At 4:25 PM

## 2021-06-25 NOTE — PROGRESS NOTES
0850 PULSE OX RESULTS ON . 21 ROOM AIR AT REST 84% SAT 4 L/M AT REST 94% SAT  4 L/M EXERCISED 91% SAT

## 2021-06-25 NOTE — PROGRESS NOTES
Pt apneic. Pupils fixed, equal and dilated. No spontaneous respirations, no palpable or dopplered pulse or blood pressure. Cap refill is greater than 5 seconds. Dr. Nuno Dodson at bedside and pronounced the patient as  at this time. Wife, Brittany Fried, notified of death. The wife advised to send the patient's cell phone,  and any clothing with the patient.

## 2021-06-25 NOTE — PROGRESS NOTES
Comprehensive Nutrition Assessment    Type and Reason for Visit:  Reassess    Nutrition Recommendations/Plan: Start ONS BID    Nutrition Assessment:  Pt continues to be at risk for further nutritional decline r/t poor PO intake of <25% of meals. Will trial ONS BID at this time to promote calorie and protein intake. Will continue to follow. Malnutrition Assessment:  Malnutrition Status:  Severe malnutrition    Context:  Acute Illness       Estimated Daily Nutrient Needs:  Energy (kcal):  1345-6435 kcals (25-30 kcals/kg); Weight Used for Energy Requirements:  Current     Protein (g):   g; Weight Used for Protein Requirements:  Current        Fluid (ml/day):  7202-0424 ml; Method Used for Fluid Requirements:  1 ml/kcal      Nutrition Related Findings:  thin      Wounds:  None       Current Nutrition Therapies:    ADULT DIET; Regular; Low Fat/Low Chol/High Fiber/2 gm Na  Adult Oral Nutrition Supplement; Standard High Calorie/High Protein Oral Supplement    Anthropometric Measures:  · Height: 5' 11\" (180.3 cm)  · Current Body Weight: 144 lb (65.3 kg)   · Admission Body Weight: 144 lb 3.2 oz (65.4 kg)    · Usual Body Weight: 148 lb (67.1 kg) (1 week ago)     · Ideal Body Weight: 172 lbs; % Ideal Body Weight 83.7 %   · BMI: 20.1  · BMI Categories: Underweight (BMI less than 22) age over 72       Nutrition Diagnosis:   · Unintended weight loss, Severe malnutrition related to acute injury/trauma, catabolic illness as evidenced by weight loss greater than or equal to 2% in 1 week, BMI, NPO or clear liquid status due to medical condition, mild muscle loss, moderate loss of subcutaneous fat      Nutrition Interventions:   Food and/or Nutrient Delivery:  Continue Current Diet, Start Oral Nutrition Supplement  Nutrition Education/Counseling:  No recommendation at this time   Coordination of Nutrition Care:  Continue to monitor while inpatient    Goals:  diet advanced with good tolerance.   Weight stable or increase 1-2# per week.   PO intake 50% or greater       Nutrition Monitoring and Evaluation:   Behavioral-Environmental Outcomes:  None Identified   Food/Nutrient Intake Outcomes:  Diet Advancement/Tolerance, Food and Nutrient Intake  Physical Signs/Symptoms Outcomes:  Biochemical Data, Nutrition Focused Physical Findings, Skin, Weight     Discharge Planning:    Continue Oral Nutrition Supplement     Electronically signed by Isidro Gambino RD, LD on 6/25/21 at 2:33 PM CDT    Contact: [phone]

## 2021-06-25 NOTE — PROGRESS NOTES
Consult noted. This pt lives outside of [facility]. Consult will need to be sent to a Hospice in [facility] of the pt/family choice. This ch will provide support and hospice info.

## 2021-06-25 NOTE — PLAN OF CARE
Problem: Nutrition  Goal: Optimal nutrition therapy  Outcome: Ongoing   Nutrition Problem #1: Unintended weight loss, Severe malnutrition  Intervention: Food and/or Nutrient Delivery: Continue Current Diet, Start Oral Nutrition Supplement  Nutritional Goals: diet advanced with good tolerance. Weight stable or increase 1-2# per week.   PO intake 50% or greater

## 2021-06-25 NOTE — CARE COORDINATION
Left message on Kristen's vm regarding need for hospice care and home O2 and possible discharge today with these services.   Awaiting callback

## 2021-06-25 NOTE — PROGRESS NOTES
Up to [facility] with assist x 2. Pt's eyes rolled back in his head, respirations agonal @ 8 bpm. Pt not responding to sternal rub. This is the third time this shift that this has occurred. Rapid Response called. Pt put back to bed and starting to respond to name. Dr. Joie Dhillon at bedside and made aware of situation. Pt is not safe to leave unattended. PCA at bedside until sitter can assume responsibilities.

## 2021-06-28 LAB
EKG P AXIS: 77 DEGREES
EKG P AXIS: 78 DEGREES
EKG P-R INTERVAL: 146 MS
EKG P-R INTERVAL: 148 MS
EKG Q-T INTERVAL: 350 MS
EKG Q-T INTERVAL: 370 MS
EKG QRS DURATION: 90 MS
EKG QRS DURATION: 92 MS
EKG QTC CALCULATION (BAZETT): 421 MS
EKG QTC CALCULATION (BAZETT): 431 MS
EKG T AXIS: 52 DEGREES
EKG T AXIS: 76 DEGREES

## 2022-04-12 NOTE — PROGRESS NOTES
Patient's wife, Brittany Fried, advised that patient wanted to be a \"total body donation\", and that she has already spoken to the agency that will do that. She provided the number of [phone]. Information given to [address] Supervisor, Fredrick Wick RN. Fusiform Excision Additional Text (Leave Blank If You Do Not Want): The margin was drawn around the clinically apparent lesion.  A fusiform shape was then drawn on the skin incorporating the lesion and margins.  Incisions were then made along these lines to the appropriate tissue plane and the lesion was extirpated.